# Patient Record
(demographics unavailable — no encounter records)

---

## 2024-10-17 NOTE — IMAGING
[de-identified] : Left Shoulder Exam:  Inspection: Portals healed Palpation: Tenderness to palpation  globally over shoulder Stability: No instability or tenderness over AC joint Range of motion: 130, 70, 40, 20 3/5 ss and is Motor and sensory intact distally

## 2024-10-17 NOTE — ASSESSMENT
[FreeTextEntry1] : I reviewed the postoperative MRI in detail.  The insertional repair site appears mostly intact with some signal change suggesting incomplete healing possibly a very small partial Re-tear.  I explained the insertion site will normally look like this after a large repair however the muscle atrophy is the most striking part of the MRI finding.  This is directly a result of lack of physical therapy and not enough rotator cuff strengthening rehabilitation.  He tells me this is still pending approval.  Given the large size of the tear and the marked difficulty in repairing the torn tendon I am not surprised that this is taking longer than usual for him to recover the strength and for his pain to improve.  Although I do not see additional surgery needed based on the MRI findings I do believe that he needs continued uninterrupted physical therapy to address the atrophy and the weakness in the shoulder.  He is also complaining of worsening right shoulder pain as he is sleeping exclusively on the right side and using the right side for everything given that he is still symptomatic and weak on the left shoulder.

## 2024-10-17 NOTE — WORK
[Severe Partial] : severe partial [Lifting] : lifting [Pulling/Pushing] : pulling/pushing [Reaching overhead] : reaching overhead [Reaching at/below shoulder level] : reaching at or below shoulder level [No Rx restrictions] : No Rx restrictions. [I provided the services listed above] :  I provided the services listed above. [Less than Sedentary Work:] :  Less than Sedentary Work: Unable to meet the requirement of Sedentary Work.

## 2024-10-17 NOTE — HISTORY OF PRESENT ILLNESS
[Burning] : burning [Sharp] : sharp [Constant] : constant [7] : 7 [Dull/Aching] : dull/aching [Throbbing] : throbbing [Tightness] : tightness [Leisure] : leisure [Sleep] : sleep [Social interactions] : social interactions [Rest] : rest [Nothing helps with pain getting better] : Nothing helps with pain getting better [Not working due to injury] : Work status: not working due to injury [de-identified] : 3/25/24: Patient is here for 1st post op visit from s on 3/19/24 - left shoulder RTC repair including subscapularis repair, extensive GH debridement, SAD. Noticed a rash develop all over the shoulder once dressing was changed. Has been using cortisone cream around incision site. Notes slight improvement.   4.1.24 pt is here for 2nd post op.  here to take out sutures  5.13.24 Patient here for left shoulder. DOS: 3.19.24. He goes to physical therapy 3x a week but still has a pain.  6.17.24 Patient here for left shoulder pain. DOS: 3.19.24. Patient states he fells slight improvement since last visit  8/22/24: here to follow up on left shoulder. Had RTC repair on 3/19/24. Finished PT last week due to needing new authorization from workers comp. Difficulty with ROM. Unable to lift arm fully. Pain with extending across. Currently not working.   10/3/24: here to follow up on left shoulder. Attending PT with some improvement. Continued pain with lifting/tender to touch.   10/17/2024: Patient here for workers comp. follow up of left shoulder. Patient states he is feeling pain and discomfort still. Patient did do PT in the past; however, he had to stop due to the workers comp. Patient states that he is waiting for approval to go to PT again. Patient states his right shoulder is also starting to bother him. Patient had MRI done on Monday and here for those results as well.   [] : no [FreeTextEntry1] : Left Shoulder  [de-identified] : Movement and activity

## 2024-10-31 NOTE — IMAGING
[de-identified] : Shoulder Exam Inspection: No swelling, no ecchymosis, no felicita deformity Palpation: Tenderness to palpation over greater tuberosity Stability: No instability or tenderness over AC joint Range of Motion: Active Forward Flexion 180 Passive FF: 180; ER: 90: IR: 70; ER at the side 50 Strength: 4-/5 supraspinatus, 4/5infraspinatus and subscapularis; there is pain with strength testing Special testing: Positive Win test, positive impingement sign; Speeds and yergason negative Neuro: Motor and sensory intact distally [Right] : right shoulder [Cephalic migration of humeral head] : Cephalic migration of humeral head [Type 2 acromion] : Type 2 acromion

## 2024-10-31 NOTE — ASSESSMENT
[FreeTextEntry1] : 2 months of atraumatic right shoulder pain.  Worked his whole life as a .  There is some superior migration of the humeral head on x-ray suggesting chronic rotator cuff pathology likely exacerbated while recovering from left rotator cuff surgery.  Corticosteroid injection administered for relief today.  Formal course of physical therapy discussed.  Will continue with the anti-inflammatories as needed.  If no improvement discussed an MRI but based on the x-rays I suspect that this may be a early rotator cuff arthropathy situation.

## 2024-10-31 NOTE — PROCEDURE
[FreeTextEntry3] : - Large joint injection was performed of the right subacromial space.  - The indication for this procedure was pain and inflammation. Patient has tried OTC's including aspirin, Ibuprofen, Aleve, etc or prescription NSAIDS, and/or exercises at home and/or physical therapy without satisfactory response, patient had decreased mobility in the joint and the risks benefits, and alternatives have been discussed, and verbal consent was obtained. The site was prepped with alcohol, betadine, ethyl chloride sprayed topically and sterile technique used.  - An injection of Betamethasone 2cc; Marcaine 5cc injected. - Patient was advised to call if redness, pain or fever occur, apply ice for 15 minutes out of every hour for the next 12-24 hours as tolerated and patient was advised to rest the joint(s) for 2 days.  
22-May-2019

## 2024-10-31 NOTE — HISTORY OF PRESENT ILLNESS
[de-identified] : 10/31/24: Patient is here for right shoulder pain that began in 7/2024, not due to injury. Denies n/t. Pain is anterior, and does not radiate. Experiences clicking. Worsens with lifting/extending. Discomfort with sleep. No prior injury. No formal treatment. Did not try medication for pain.

## 2024-12-05 NOTE — IMAGING
[de-identified] : Left Shoulder Exam:  Inspection: Portals healed Palpation: Tenderness to palpation  globally over shoulder Stability: No instability or tenderness over AC joint Range of motion: 150, 70, 40, 20 3+/5 ss and is Motor and sensory intact distally

## 2024-12-05 NOTE — ASSESSMENT
[FreeTextEntry1] : Range of motion and strength improving but very slowly.  Continue to request uninterrupted physical therapy.  Given the patient's age and nature of the injury.  Due to the massive nature of the tear and the difficulty in repairing expect that he will require over 1 year of physical therapy in order to recover similar level of function as he had preinjury.  However he may never recover at the same level of function even with physical therapy.  He understands this.  Unable to work in any capacity unless sedentary at this point.

## 2024-12-05 NOTE — HISTORY OF PRESENT ILLNESS
[7] : 7 [Burning] : burning [Dull/Aching] : dull/aching [Sharp] : sharp [Throbbing] : throbbing [Tightness] : tightness [Constant] : constant [Leisure] : leisure [Sleep] : sleep [Social interactions] : social interactions [Rest] : rest [Nothing helps with pain getting better] : Nothing helps with pain getting better [Not working due to injury] : Work status: not working due to injury [de-identified] : 3/25/24: Patient is here for 1st post op visit from sx on 3/19/24 - left shoulder RTC repair including subscapularis repair, extensive GH debridement, SAD. Noticed a rash develop all over the shoulder once dressing was changed. Has been using cortisone cream around incision site. Notes slight improvement.   4.1.24 pt is here for 2nd post op.  here to take out sutures  5.13.24 Patient here for left shoulder. DOS: 3.19.24. He goes to physical therapy 3x a week but still has a pain.  6.17.24 Patient here for left shoulder pain. DOS: 3.19.24. Patient states he fells slight improvement since last visit  8/22/24: here to follow up on left shoulder. Had RTC repair on 3/19/24. Finished PT last week due to needing new authorization from workers comp. Difficulty with ROM. Unable to lift arm fully. Pain with extending across. Currently not working.   10/3/24: here to follow up on left shoulder. Attending PT with some improvement. Continued pain with lifting/tender to touch.   10/17/2024: Patient here for workers comp. follow up of left shoulder. Patient states he is feeling pain and discomfort still. Patient did do PT in the past; however, he had to stop due to the workers comp. Patient states that he is waiting for approval to go to PT again. Patient states his right shoulder is also starting to bother him. Patient had MRI done on Monday and here for those results as well.    12/5/24: here to follow up on left shoulder. Attending PT (Radha Garcia). Notes continued weakness/atrophy. Occasional pain with lifting. Currently not working.  DOI: 1/16/24.  [] : no [FreeTextEntry1] : Left Shoulder  [de-identified] : Movement and activity

## 2025-03-24 NOTE — WORK
[Severe Partial] : severe partial [Lifting] : lifting [Pulling/Pushing] : pulling/pushing [Reaching overhead] : reaching overhead [Reaching at/below shoulder level] : reaching at or below shoulder level [No Rx restrictions] : No Rx restrictions. [I provided the services listed above] :  I provided the services listed above.

## 2025-03-24 NOTE — IMAGING
[de-identified] : Left Shoulder Exam:  Inspection: Portals healed Palpation: Tenderness to palpation  globally over shoulder Stability: No instability or tenderness over AC joint Range of motion: 150, 60, 30, 20 3+/5 ss and is Motor and sensory intact distally

## 2025-03-24 NOTE — HISTORY OF PRESENT ILLNESS
[7] : 7 [Burning] : burning [Dull/Aching] : dull/aching [Sharp] : sharp [Throbbing] : throbbing [Tightness] : tightness [Constant] : constant [Leisure] : leisure [Sleep] : sleep [Social interactions] : social interactions [Rest] : rest [Nothing helps with pain getting better] : Nothing helps with pain getting better [Not working due to injury] : Work status: not working due to injury [de-identified] : 3/25/24: Patient is here for 1st post op visit from sx on 3/19/24 - left shoulder RTC repair including subscapularis repair, extensive GH debridement, SAD. Noticed a rash develop all over the shoulder once dressing was changed. Has been using cortisone cream around incision site. Notes slight improvement.   4.1.24 pt is here for 2nd post op.  here to take out sutures  5.13.24 Patient here for left shoulder. DOS: 3.19.24. He goes to physical therapy 3x a week but still has a pain.  6.17.24 Patient here for left shoulder pain. DOS: 3.19.24. Patient states he fells slight improvement since last visit  8/22/24: here to follow up on left shoulder. Had RTC repair on 3/19/24. Finished PT last week due to needing new authorization from workers comp. Difficulty with ROM. Unable to lift arm fully. Pain with extending across. Currently not working.   10/3/24: here to follow up on left shoulder. Attending PT with some improvement. Continued pain with lifting/tender to touch.   10/17/2024: Patient here for workers comp. follow up of left shoulder. Patient states he is feeling pain and discomfort still. Patient did do PT in the past; however, he had to stop due to the workers comp. Patient states that he is waiting for approval to go to PT again. Patient states his right shoulder is also starting to bother him. Patient had MRI done on Monday and here for those results as well.    12/5/24: here to follow up on left shoulder. Attending PT (Radha Garcia). Notes continued weakness/atrophy. Occasional pain with lifting. Currently not working.  DOI: 1/16/24.   3.24.25 Patient here for left shoulder pain. Patient states he still has pain [] : no [FreeTextEntry1] : Left Shoulder  [de-identified] : Movement and activity

## 2025-03-27 NOTE — IMAGING
[de-identified] : Shoulder Exam Inspection: No swelling, no ecchymosis, no felicita deformity Palpation: Tenderness to palpation over greater tuberosity Stability: No instability or tenderness over AC joint Passive FF: 160; ER: 70: IR: 50; ER at the side 50 Strength: 4-/5 supraspinatus, 4/5infraspinatus and subscapularis; there is pain with strength testing Special testing: Positive Win test, positive impingement sign; Speeds and yergason negative Neuro: Motor and sensory intact distally

## 2025-03-27 NOTE — HISTORY OF PRESENT ILLNESS
[de-identified] : Here for right shoulder pain from exacerbation from having left shoulder cuff repair 3/19/2024 which was due to WC DOI 1/17/24. Had CSI in October for the right shoulder but had started to have pain again over the past couple months. Currently not working

## 2025-03-27 NOTE — ASSESSMENT
[FreeTextEntry1] : As stated on my note from October 2024 the right shoulder is likely experiencing a case of  rotator cuff pathology likely exacerbated while recovering from left rotator cuff surgery.  The left arm was initially in a sling and he had very little use of the left arm while recovering from shoulder surgery and had to rely on the right arm much more extensively.  He Received a corticosteroid injection in October with only temporary relief.  He remains limited and weak with the right shoulder therefore an MRI is warranted at this time to assess need for further interventions.  Kindly request the right shoulder be added under the Worker's Comp. case as well.  He is currently out of work

## 2025-04-23 NOTE — HISTORY OF PRESENT ILLNESS
[Work related] : work related [7] : 7 [5] : 5 [Dull/Aching] : dull/aching [Not working due to injury] : Work status: not working due to injury [] : yes [FreeTextEntry1] : left shoulder  [FreeTextEntry3] : 1/17/24

## 2025-04-23 NOTE — IMAGING
[de-identified] : Right Shoulder Exam Inspection: No swelling, no ecchymosis, no felicita deformity Palpation: Tenderness to palpation over greater tuberosity Stability: No instability or tenderness over AC joint Passive FF: 160; ER: 70: IR: 50; ER at the side 50 Strength: 4-/5 supraspinatus, 4/5infraspinatus and subscapularis; there is pain with strength testing Special testing: Positive Win test, positive impingement sign; Speeds and yergason negative Neuro: Motor and sensory intact distally

## 2025-06-26 NOTE — HISTORY OF PRESENT ILLNESS
[Work related] : work related [8] : 8 [5] : 5 [Dull/Aching] : dull/aching [Constant] : constant [Not working due to injury] : Work status: not working due to injury [] : no [FreeTextEntry1] : L + R shoulder [FreeTextEntry3] : 1/17/24 [FreeTextEntry5] : Patient returns for follow up, now the right shoulder is added to the comp case. is still having pain in both. no sig changes

## 2025-06-26 NOTE — ASSESSMENT
[FreeTextEntry1] : Right shoulder has been added to the case - acute exacerbation of chronic R shoulder pain  Will get MRI R shoulder to eval due to significant restricted ROM and strength  Use of cryotherapy discussed Naproxen rx sent to pharmacy - r/b/a discussed - instructed how to take medication - instructed that if GI upset occurs to stop medication Unable to work in this capacity  Prognosis uncertain at this time  Discussed possible surgery depending on results  RTC after MRI to review results    The patient's current medication management of their orthopedic diagnosis was reviewed today: (1) We discussed a comprehensive treatment plan that included pharmaceutical management involving the use of prescription medications. (2) There is a moderate risk of morbidity with further treatment, especially from use of prescription strength medications and possible side effects of these medications which include upset stomach with oral medications, skin reactions to topical medications and cardiac/renal/diabetes issues with long term use. (3) I recommended that the patient follow-up with their medical physician to discuss any significant specific potential issues with long term medication use such as interactions with current medications or with exacerbation of underlying medical comorbidities. (4) The benefits and risks associated with use of injectable, oral or topical, prescription and over the counter anti-inflammatory medications were discussed with the patient. The patient voiced understanding of the risks including but not limited to bleeding, stroke, kidney dysfunction, heart disease, and were referred to the black box warning label for further information.  I am working today under the supervision of Dr. Miller and I am following the plan of care of Dr. Miller as described by him on this date. Progress note completed by Tabatha Gould PA-C under the supervision of Dr. Miller.  
 used

## 2025-06-26 NOTE — IMAGING
[de-identified] : Right Shoulder Exam Inspection: No swelling, no ecchymosis, no felicita deformity Palpation: Tenderness to palpation over greater tuberosity Stability: No instability or tenderness over AC joint Passive FF: 120; ER: 70: IR: 40; ER at the side 40 Strength: 4-/5 supraspinatus, 4/5infraspinatus and subscapularis; there is pain with strength testing Special testing: Positive Win test, positive impingement sign; Speeds and yergason negative Neuro: Motor and sensory intact distally